# Patient Record
(demographics unavailable — no encounter records)

---

## 2024-11-07 NOTE — HISTORY OF PRESENT ILLNESS
[Former] : former [< 20 pack-years] : < 20 pack-years [Never] : never [TextBox_4] : Mr. PAZ is a 54 year old man, former light smoker (2y x 1ppd, quit 30y ago), with PMH diabetes, hepB, who presents to Pulmonary clinic for f/u of abnormal CT chest. 2023 Pt went back to [facility] recently (2023) and underwent LDCT. Mother  from lung cancer 1y ago (adenocarcinoma); was a lifelong non-smoker. Pt now obtains frequent CT Chest imaging given his mother's h/o lung adenocarcinoma;  1y ago due to complications of stage 4 ca. In  CT report noted multiple small solid pulmonary nodules bilaterally, largest 0.75cm. On 23 CT chest there were seen one ground glass nodule and several solid nodules in RLL, OBINNA, LLL largest ~1cm; radiologist also read that there was no significant interval change compared to prior study.  Pt works in  and goes to [facility] once yearly. Works for a [employer]. No respiratory symptoms at time of exam. Had COVID-19 1-2y ago, no hospitalizations or severe disease. Used to work in [place] with severe seasonal allergies, now without signficant sx, sometimes with mild seasonal allergies in [place]. No secondhand smoke exposure. No toxic/environmental exposures. 25y ago had birds as pet. EtOH <1beer/whiskey weekly. No spelunking, travel to [facility]. No TB infx or exposure in past.  10/30/23 Since last visit pt has been feeling well, no active respiratory sx, no interval respiratory illness. At this time, he denies anginal/pleuritic CP, SOB/ALLEN, coughing, wheezing, stridor, orthopnea, hemoptysis, LE edema, sick contacts, recent travel, history of frequent URIs/LRTIs, recent use of antibiotics/steroids, recent hospitalization. Underwent repeat CT chest non con 10/19/23 with two 1-2mm solid micronodules seen at RLL and LLL; additional 0.5cm area at R base likely scarring. Overall stable to improved findings compared to prior study.  24 Since last visit pt has been feeling well. 1.5mos ago had COVID-19 infx with cough and sore throat, sx now resolved. Now without active respiratory sx. Notes some mild L sided anterior chest wall pain with certain movements/position - not reproducible to palpation or pleuritic in nature.   10/28/2024 Since last visit, pt has been feeling well. He has no active respiratory sx. Previously reported mild L anterior chest wall pain not pleuritic in nature and somewhat improved. At this time, he denies anginal/pleuritic CP, SOB/ALLEN, coughing, wheezing, stridor, orthopnea, hemoptysis, LE edema, sick contacts, recent use of antibiotics/steroids, recent hospitalization, weight loss, f/c/n/v. [TextBox_11] : 1 [TextBox_13] : 2 [YearQuit] : 30y ago

## 2024-11-07 NOTE — ASSESSMENT
[FreeTextEntry1] : Mr. APZ is a 54 year old man, former light smoker (2y x 1ppd, quit 30y ago), with PMH diabetes, hepB, who presents to Pulmonary clinic for f/u of abnormal CT chest - noted with multiple pulmonary nodules without interval change compared to prior scan ( CT chest unavailable). Family hx significant for mother who  from lung cancer 1y ago (adenocarcinoma); was a lifelong non-smoker. Latest CT chest demonstrated stability in size of lung nodules, likely benign. Pt continues to be well and without respiratory sx. Discussed risks/benefits of further surveillance.   Plan: - Given pt's family hx, discussed risks and benefits of active surveillance. Pt preferring to continue active surveillance for another year. If stable in  would likely stop surveillance - Will repeat CT chest non con 1 year from recent study (10/2025).  If stable findings would likely stop routine monitoring unless respiratory/clinical status changes - Return to clinic 1 week after 10/2025 CT chest to review findings of chest imaging and clinical reevaluation. Pt knows to call for any interval pulmonary issues or concerns. Pt may be moving to [facility] in the interim, in which case he will pursue repeat CT chest with pulmonologists there

## 2024-11-07 NOTE — PROCEDURE
Valarie Colindres 74 y.o. female is here today for Blood Pressure check.   History of HTN yes.    Review of patient's allergies indicates:  No Known Allergies  Creatinine   Date Value Ref Range Status   09/24/2018 0.8 0.5 - 1.4 mg/dL Final     Sodium   Date Value Ref Range Status   09/24/2018 141 136 - 145 mmol/L Final     Potassium   Date Value Ref Range Status   09/24/2018 4.1 3.5 - 5.1 mmol/L Final   ]  Patient verifies taking blood pressure medications on a regular basis at the same time of the day.     Current Outpatient Medications:     hydroCHLOROthiazide (HYDRODIURIL) 12.5 MG Tab, Take 1 tablet (12.5 mg total) by mouth once daily., Disp: 30 tablet, Rfl: 1    valsartan (DIOVAN) 320 MG tablet, , Disp: , Rfl:     alendronate (FOSAMAX) 70 MG tablet, Take 1 tablet (70 mg total) by mouth every 7 days., Disp: 4 tablet, Rfl: 2    aspirin (ECOTRIN) 81 MG EC tablet, Take 81 mg by mouth once daily., Disp: , Rfl:     levothyroxine (SYNTHROID) 125 MCG tablet, Take 1 tablet (125 mcg total) by mouth before breakfast., Disp: 30 tablet, Rfl: 1    ranitidine (ZANTAC) 150 MG tablet, Take 1 tablet (150 mg total) by mouth 2 (two) times daily., Disp: 60 tablet, Rfl: 2  Does patient have record of home blood pressure readings yes. Readings have been averaging 109/72, 11/73, 129/81.   Last dose of blood pressure medication was taken at 7:00 am.  Patient is asymptomatic.   Complains of being a little dizzy at times. Denies any c/o headaches, chest pains, nausea , sob, numbness or tingling to extremities.    Pt brought in home blood pressure monitor. Was educated on the use of monitor.     BP: 110/80 , Pulse: 81 .     notified.     
[FreeTextEntry1] : 10- [facility] CT CHEST WITHOUT CONTRAST FINDINGS: CHEST THYROID: Visualized portion is unremarkable. LUNGS: Several noncalcified nodules are seen: 6 x 6 mm nodule seen in the right lung base image #316 of series 5. 3 mm nodule left lingula image #154. 4 mm nodule left lingula image #223. 3. mm nodule left lower lobe image #250. These nodules are grossly stable, most like represent benign nodules. Considering patient's history of smoking, follow-up CT scan in 12 months is suggested to assess for continued long-term stability. Recommendation is based upon Fleischner's Society criteria. There are perifissural nodules seen in the right middle lobe image #221, right lower lobe image #266 and in the left lower lobe image #185, felt to represent benign process probably intrafissural lymph nodes grossly stable. No areas of parenchymal consolidation is seen. TRACHEA AND BRONCHI: Unremarkable. PLEURA: There is no pleural effusion, pleural thickening or pleural mass. LYMPH NODES AND MEDIASTINUM: Unremarkable. IMPRESSION:   1. Several small noncalcified nodules seen in the lungs measuring up to 6 mm in size, most likely representing benign nodules. Considering patient's history of smoking, follow-up CT scan in 12 months is suggested to assess for continued long-term stability. 2. No infiltrates seen.
[FreeTextEntry1] : 10- [facility] CT CHEST WITHOUT CONTRAST FINDINGS: CHEST THYROID: Visualized portion is unremarkable. LUNGS: Several noncalcified nodules are seen: 6 x 6 mm nodule seen in the right lung base image #316 of series 5. 3 mm nodule left lingula image #154. 4 mm nodule left lingula image #223. 3. mm nodule left lower lobe image #250. These nodules are grossly stable, most like represent benign nodules. Considering patient's history of smoking, follow-up CT scan in 12 months is suggested to assess for continued long-term stability. Recommendation is based upon Fleischner's Society criteria. There are perifissural nodules seen in the right middle lobe image #221, right lower lobe image #266 and in the left lower lobe image #185, felt to represent benign process probably intrafissural lymph nodes grossly stable. No areas of parenchymal consolidation is seen. TRACHEA AND BRONCHI: Unremarkable. PLEURA: There is no pleural effusion, pleural thickening or pleural mass. LYMPH NODES AND MEDIASTINUM: Unremarkable. IMPRESSION:   1. Several small noncalcified nodules seen in the lungs measuring up to 6 mm in size, most likely representing benign nodules. Considering patient's history of smoking, follow-up CT scan in 12 months is suggested to assess for continued long-term stability. 2. No infiltrates seen.

## 2024-11-07 NOTE — REVIEW OF SYSTEMS
[Diabetes] : diabetes [Negative] : Psychiatric [TextBox_91] : chronic R knee pain, subacute L chest wall pain with movement

## 2024-11-07 NOTE — HISTORY OF PRESENT ILLNESS
[Former] : former [< 20 pack-years] : < 20 pack-years [Never] : never [TextBox_4] : Mr. PAZ is a 54 year old man, former light smoker (2y x 1ppd, quit 30y ago), with PMH diabetes, hepB, who presents to Pulmonary clinic for f/u of abnormal CT chest. 2023 Pt went back to [facility] recently (2023) and underwent LDCT. Mother  from lung cancer 1y ago (adenocarcinoma); was a lifelong non-smoker. Pt now obtains frequent CT Chest imaging given his mother's h/o lung adenocarcinoma;  1y ago due to complications of stage 4 ca. In  CT report noted multiple small solid pulmonary nodules bilaterally, largest 0.75cm. On 23 CT chest there were seen one ground glass nodule and several solid nodules in RLL, OBINNA, LLL largest ~1cm; radiologist also read that there was no significant interval change compared to prior study.  Pt works in  and goes to [facility] once yearly. Works for a [employer]. No respiratory symptoms at time of exam. Had COVID-19 1-2y ago, no hospitalizations or severe disease. Used to work in [place] with severe seasonal allergies, now without signficant sx, sometimes with mild seasonal allergies in [place]. No secondhand smoke exposure. No toxic/environmental exposures. 25y ago had birds as pet. EtOH <1beer/whiskey weekly. No spelunking, travel to [facility]. No TB infx or exposure in past.  10/30/23 Since last visit pt has been feeling well, no active respiratory sx, no interval respiratory illness. At this time, he denies anginal/pleuritic CP, SOB/ALLEN, coughing, wheezing, stridor, orthopnea, hemoptysis, LE edema, sick contacts, recent travel, history of frequent URIs/LRTIs, recent use of antibiotics/steroids, recent hospitalization. Underwent repeat CT chest non con 10/19/23 with two 1-2mm solid micronodules seen at RLL and LLL; additional 0.5cm area at R base likely scarring. Overall stable to improved findings compared to prior study.  24 Since last visit pt has been feeling well. 1.5mos ago had COVID-19 infx with cough and sore throat, sx now resolved. Now without active respiratory sx. Notes some mild L sided anterior chest wall pain with certain movements/position - not reproducible to palpation or pleuritic in nature.   10/28/2024 Since last visit, pt has been feeling well. He has no active respiratory sx. Previously reported mild L anterior chest wall pain not pleuritic in nature and somewhat improved. At this time, he denies anginal/pleuritic CP, SOB/ALLEN, coughing, wheezing, stridor, orthopnea, hemoptysis, LE edema, sick contacts, recent use of antibiotics/steroids, recent hospitalization, weight loss, f/c/n/v. [TextBox_11] : 1 [TextBox_13] : 2 [YearQuit] : 30y ago

## 2024-11-07 NOTE — ASSESSMENT
[FreeTextEntry1] : Mr. PAZ is a 54 year old man, former light smoker (2y x 1ppd, quit 30y ago), with PMH diabetes, hepB, who presents to Pulmonary clinic for f/u of abnormal CT chest - noted with multiple pulmonary nodules without interval change compared to prior scan ( CT chest unavailable). Family hx significant for mother who  from lung cancer 1y ago (adenocarcinoma); was a lifelong non-smoker. Latest CT chest demonstrated stability in size of lung nodules, likely benign. Pt continues to be well and without respiratory sx. Discussed risks/benefits of further surveillance.   Plan: - Given pt's family hx, discussed risks and benefits of active surveillance. Pt preferring to continue active surveillance for another year. If stable in  would likely stop surveillance - Will repeat CT chest non con 1 year from recent study (10/2025).  If stable findings would likely stop routine monitoring unless respiratory/clinical status changes - Return to clinic 1 week after 10/2025 CT chest to review findings of chest imaging and clinical reevaluation. Pt knows to call for any interval pulmonary issues or concerns. Pt may be moving to [facility] in the interim, in which case he will pursue repeat CT chest with pulmonologists there

## 2024-11-07 NOTE — PHYSICAL EXAM
[No Acute Distress] : no acute distress [Normal Oropharynx] : normal oropharynx [Normal Appearance] : normal appearance [No Neck Mass] : no neck mass [Normal Rate/Rhythm] : normal rate/rhythm [Normal S1, S2] : normal s1, s2 [No Murmurs] : no murmurs [No Resp Distress] : no resp distress [Clear to Auscultation Bilaterally] : clear to auscultation bilaterally [No Abnormalities] : no abnormalities [Benign] : benign [Normal Gait] : normal gait [No Clubbing] : no clubbing [No Cyanosis] : no cyanosis [No Edema] : no edema [FROM] : FROM [Normal Color/ Pigmentation] : normal color/ pigmentation [No Focal Deficits] : no focal deficits [Oriented x3] : oriented x3 [Normal Affect] : normal affect [TextBox_80] : no pain to palpation